# Patient Record
(demographics unavailable — no encounter records)

---

## 2025-06-18 NOTE — PHYSICAL EXAM
[No Acute Distress] : no acute distress [Normal Voice/Communication] : normal voice/communication [No Respiratory Distress] : no respiratory distress  [No Edema] : there was no peripheral edema [Normal] : affect was normal and insight and judgment were intact [de-identified] : limited PE due to telephone visit [de-identified] : no rash or skin lesions reported

## 2025-06-18 NOTE — ASSESSMENT
[FreeTextEntry1] : Ms. Peralta is a 76yo woman w/ hx of NiCMY w/ HFrEF of unclear etiology (prev dx w/ HFpEF and AFib in [place] ~2020; non-obs CAD from 11/2024 cath), AFib on apixaban, non-obstructive CAD, and asthma who is here today for post-hospitalization follow up.  #NiCMY w/ HFrEF Pt w/ reported hx of HFpEF in dx in [place], non-obstructive LHC from 11/2024 w/ a few hospitalizations for ADHF in [place] as well. She is unsure if she's ever had a CMR or further w/up of the etiology of her HF. She was taking half dose medications [place] leading to ADHF and admission at [facility] where she was found to have EF 20% w/ global hypokinesis, grade 3 DD and severe TR. She was resumed on some GDMT and diuresed and dc'ed.  Etiology: NiCMY - unclear further, will require w/up GDMT: ARB, BB, MRA as below Rhythm: Currently in AFib, rate controlled; LBBB - will likely require CRT upgrade if EF doesn't improve - start losartan 25mg qd, discussed side effects and red flag sx to be aware of - Switch metop tartrate to metop succinate 100mg qd; instructed to take at night to avoid day time fatigue - Continue Spironolactone 25mg qd - Continue Lasix 20mg qd - can consider SGLT2i at next visit if pt able to pay out of pocket - will get r/p TTE at next visit  #AFib Unkonwn hx of AFib, but seems likely long standing persistent AFib given hx. [id number] is 6 (CHF, Age x2, DM, CAD, Female). Pt would likely benefit from EP referral given HFrEF, LBBB and Afib for consideration of ablation +/- CRT therapy. - Continue Apixaban 5mg BID - Continue BB as above - DC digoxin - discuss EP referral at next visit for CRT + AFib ablation evaluation  #Non-Obstructive CAD Labs 5/2025: Tchol 124, HDL 43, LDL 81, TGs 124, A1c 5.6, Cr 0.8 - Goal LDL <70 - Continue Rosuvastatin 10mg - No indication for ASA given AC as above - r/p lipids at next visit  RTC in 2-4w for f/up with new medications and lab check

## 2025-06-18 NOTE — PHYSICAL EXAM

## 2025-06-18 NOTE — PHYSICAL EXAM
[No Acute Distress] : no acute distress [Normal Voice/Communication] : normal voice/communication [No Respiratory Distress] : no respiratory distress  [No Edema] : there was no peripheral edema [Normal] : affect was normal and insight and judgment were intact [de-identified] : limited PE due to telephone visit [de-identified] : no rash or skin lesions reported

## 2025-06-18 NOTE — PLAN
[FreeTextEntry1] : 1.continue all medications as prescribed 2. f/u with CARDS/PCP 3. Maintain DASH diet 4. maintain fall precaution

## 2025-06-18 NOTE — HISTORY OF PRESENT ILLNESS
[Home] : at home, [unfilled] , at the time of the visit. [Other Location: e.g. Home (Enter Location, City,State)___] : at [unfilled] [Telephone (audio)] : This telephonic visit was provided via audio only technology. [Patient preference] : patient preference. [Verbal consent obtained from patient] : the patient, [unfilled] [FreeTextEntry1] : F/u post hospitalization at [facility] from 5/30-6/4 for CHF exacerbation, Afib with RVR, elevated trop, chest pain [de-identified] : Brief Hospital Course copied: 77 years old female with history of HTN, HLD, Asthma, A. fib on apixaban, HFpEF, nonobstructive CAD, DM II presented w/ SOB and episode of chest pain & admitted w/ A. fib w/ RVR and acute on chronic sys/ diastolic congestive heart failure. Her atrial fibrillation with rapid ventricular response was likely CHF driven and improved w/ metoprolol and dig. She was also given Eliquis. Cardiology was consulted. She was put on metoprolol, Aldactone and Lasix for her acute on chronic sys/ diastolic congestive heart failure. ECHO showed EF  21% w/ global left ventricular hypokinesis, moderate left ventricular hypertrophy, severe (grade 3) left ventricular diastolic dysfunction, moderately dilated bilateral atria, trace aortic regurgitation, mild mitral regurgitation, severe tricuspid regurgitation, mild pulmonic regurgitation & mild pulmonary hypertension. Her dyspnea has resolved. She also had elevated troponin that peaked at 477. Cath 11/28/2024 Left main-normal. Circumflex 20-30%, RCA 20-30% (record from niece, done in [place], copy of ECHO, prior EKG strip and Cath placed in patient's chart). This was likely demand due to CHF exacerbation.   Telephone visit made with pt and her niece and nephew. Pt is awake and alert able to answer some questions and nephew provides additional information. 3. Pt lives with family. She ambulates with a walker and family members assist her when ambulating. Pt/nephew   deny CP, SOB, vision changes, uses a reading glasses, nausea, vomiting, diarrhea, constipation, falls, edema. Reports feeling better since being home Reports appetite is intact, sleeping well at nights. Skin is intact. Med rec done:  pt/nephew reports they have all medications and pt is taking them as prescribed.

## 2025-06-18 NOTE — PHYSICAL EXAM

## 2025-06-18 NOTE — HISTORY OF PRESENT ILLNESS
[Home] : at home, [unfilled] , at the time of the visit. [Other Location: e.g. Home (Enter Location, City,State)___] : at [unfilled] [Telephone (audio)] : This telephonic visit was provided via audio only technology. [Patient preference] : patient preference. [Verbal consent obtained from patient] : the patient, [unfilled] [FreeTextEntry1] : [facility]F exacerbation, Afib with RVR, elevated trop, chest pain [de-identified] : Brief Hospital Course copied: 77 years old female with history of HTN, HLD, Asthma, A. fib on apixaban, HFpEF, nonobstructive CAD, DM II presented w/ SOB and episode of chest pain & admitted w/ A. fib w/ RVR and acute on chronic sys/ diastolic congestive heart failure. Her atrial fibrillation with rapid ventricular response was likely CHF driven and improved w/ metoprolol and dig. She was also given Eliquis. Cardiology was consulted. She was put on metoprolol, Aldactone and Lasix for her acute on chronic sys/ diastolic congestive heart failure. ECHO showed EF  21% w/ global left ventricular hypokinesis, moderate left ventricular hypertrophy, severe (grade 3) left ventricular diastolic dysfunction, moderately dilated bilateral atria, trace aortic regurgitation, mild mitral regurgitation, severe tricuspid regurgitation, mild pulmonic regurgitation & mild pulmonary hypertension. Her dyspnea has resolved. She also had elevated troponin that peaked at 477. Cath 11/28/2024 Left main-normal. Circumflex 20-30%, RCA 20-30% (record from niece, done in [place], copy of ECHO, prior EKG strip and Cath placed in patient's chart). This was likely demand due to CHF exacerbation.   Telephone visit made with pt and her niece and nephew. Pt is awake and alert able to answer some questions and nephew provides additional information. 3. Pt lives with family. She ambulates with a walker and family members assist her when ambulating. Pt/nephew   deny CP, SOB, vision changes, uses a reading glasses, nausea, vomiting, diarrhea, constipation, falls, edema. Reports feeling better since being home Reports appetite is intact, sleeping well at nights. Skin is intact. Med rec done:  pt/nephew reports they have all medications and pt is taking them as prescribed.

## 2025-06-18 NOTE — PHYSICAL EXAM
[No Acute Distress] : no acute distress [Normal Voice/Communication] : normal voice/communication [No Respiratory Distress] : no respiratory distress  [No Edema] : there was no peripheral edema [Normal] : affect was normal and insight and judgment were intact [de-identified] : limited PE due to telephone visit [de-identified] : no rash or skin lesions reported

## 2025-06-18 NOTE — REVIEW OF SYSTEMS
[Fatigue] : fatigue [Vision Problems] : vision problems [Muscle Weakness] : muscle weakness [Negative] : Heme/Lymph [Fever] : no fever [Chills] : no chills [Hearing Loss] : no hearing loss [Chest Pain] : no chest pain [Lower Ext Edema] : no lower extremity edema [Shortness Of Breath] : no shortness of breath [Wheezing] : no wheezing [Cough] : no cough [Abdominal Pain] : no abdominal pain [Nausea] : no nausea [Constipation] : no constipation [Vomiting] : no vomiting [Dysuria] : no dysuria [Incontinence] : no incontinence [Hematuria] : no hematuria [Joint Pain] : no joint pain [Joint Stiffness] : no joint stiffness [Muscle Pain] : no muscle pain [Back Pain] : no back pain [Itching] : no itching [Skin Rash] : no skin rash [Headache] : no headache [Dizziness] : no dizziness [Unsteady Walking] : no ataxia [Suicidal] : not suicidal [Insomnia] : no insomnia [FreeTextEntry3] : reading

## 2025-06-18 NOTE — ASSESSMENT
[FreeTextEntry1] : Ms. Peralta is a 78yo woman w/ hx of NiCMY w/ HFrEF of unclear etiology (prev dx w/ HFpEF and AFib in [place] ~2020; non-obs CAD from 11/2024 cath), AFib on apixaban, non-obstructive CAD, and asthma who is here today for post-hospitalization follow up.  #NiCMY w/ HFrEF Pt w/ reported hx of HFpEF in dx in [place], non-obstructive LHC from 11/2024 w/ a few hospitalizations for ADHF in [place] as well. She is unsure if she's ever had a CMR or further w/up of the etiology of her HF. She was taking half dose medications [place] leading to ADHF and admission at [facility] where she was found to have EF 20% w/ global hypokinesis, grade 3 DD and severe TR. She was resumed on some GDMT and diuresed and dc'ed.  Etiology: NiCMY - unclear further, will require w/up GDMT: ARB, BB, MRA as below Rhythm: Currently in AFib, rate controlled; LBBB - will likely require CRT upgrade if EF doesn't improve - start losartan 25mg qd, discussed side effects and red flag sx to be aware of - Switch metop tartrate to metop succinate 100mg qd; instructed to take at night to avoid day time fatigue - Continue Spironolactone 25mg qd - Continue Lasix 20mg qd - can consider SGLT2i at next visit if pt able to pay out of pocket - will get r/p TTE at next visit  #AFib Unkonwn hx of AFib, but seems likely long standing persistent AFib given hx. [id number] is 6 (CHF, Age x2, DM, CAD, Female). Pt would likely benefit from EP referral given HFrEF, LBBB and Afib for consideration of ablation +/- CRT therapy. - Continue Apixaban 5mg BID - Continue BB as above - DC digoxin - discuss EP referral at next visit for CRT + AFib ablation evaluation  #Non-Obstructive CAD Labs 5/2025: Tchol 124, HDL 43, LDL 81, TGs 124, A1c 5.6, Cr 0.8 - Goal LDL <70 - Continue Rosuvastatin 10mg - No indication for ASA given AC as above - r/p lipids at next visit  RTC in 2-4w for f/up with new medications and lab check

## 2025-06-18 NOTE — HISTORY OF PRESENT ILLNESS
[FreeTextEntry1] : Ms. Peralta is a 78yo woman w/ hx of NiCMY w/ HFrEF of unclear etiology (prev dx w/ HFpEF and AFib in [place] ~2020), AFib on apixaban, non-obstructive CAD, and asthma who is here today for post-hospitalization follow up.  Pt has never been seen in cardiology clinic at [facility] before. [place], lives more permanently in [place], however is planning to now spend part of the [place] in [place] moving [place]. She was dx with HFpEF and AFib years ago (she had an admission in ~2015 for something cardiac related (was told she had a minor MI) but is unsure), and was told her HF was 55% in 2020 when she was hospitalized for ADHF. She has had a few hospitalizations for HF in [place], the last in 8/2024 (was told HF was still ~50%), and she had a angiogram in 11/2024 in [place] that showed mild, non-obstructive CAD. Her CV medications in [place] were Telmisartan, Bisoprolol, Spironolactone, lasix, Apixaban, and Rosuvastatin which suggest possible HFrEF, but unclear. She has the report with her of her Cath (entered below). [place] and had been taking half doses of medications in order to ensure she didn't run out. She developed significant CATALAN, orthopnea and PND which prompted her to present to [facility] in late 5/2025 where she was found to be in ADHF w/ AFib w/ RVR. She was started on diuresis and rate control. She was dc'ed on 6/9 on Apixaban 5mg BID, Lasix 20mg qd, Metop tartrate 25mg TID, Dig 125mcg, Spironolactone 25mg and rosuvastatin 10mg.   Since DC, she reports feeling significantly better, denies further orthopnea, PND, LE edema (had minimal edema prior to the hospitalization), chest pain, SOB, palpitations, dizziness. She is living with her niece and her nieces  who help with her medical care. She has been walking multiple times a day and can walk on level ground w/o stopping for >5min w/o issue. She has been checking her BP at home which has been ranging in the 100-140s/70-80s. She has no  complaints today. [place] the [place] few months prior to returning to [place] in the fall.  CV Hx from [place]: - Prior AFib, doesn't know duration or if any rhythm control strategy has been attempted; has been on apixaban and BB - HFrEF, NiCMY - prev told she had HFpEF as recent as 8/2024 w/ a few hospitalizations in past for ADHF in [place]; [facility] w/ mild non-obstructive CAD from 11/2024, doesn't know if she's had any further work up. (has been on ARB, BB, MRA) - CV Meds in [place]: Telmisartan, Bisoprolol, Spironolactone, lasix, Apixaban, and Rosuvastatin   CV Meds: Apixaban 5mg BID Rosuvastatin 10mg qd Lasix 20mg qd Spironolactone 25mg qd Metop tartate 25mg TID Dig 125mcg  Labs 5/2025: Tchol 124, HDL 43, LDL 81, TGs 124, A1c 5.6, Cr 0.8  TTE 5/30/25 LVED 21% w/ mild dilated LV, global hypokinesis, moderate LVH, grade 3 diastolic dysfx, GLS -4.4%, severe TR.   [facility] 11/28/2024 done in [place]  LCx 20-30%, RCA 20-30%

## 2025-06-18 NOTE — HISTORY OF PRESENT ILLNESS
[Home] : at home, [unfilled] , at the time of the visit. [Other Location: e.g. Home (Enter Location, City,State)___] : at [unfilled] [Telephone (audio)] : This telephonic visit was provided via audio only technology. [Patient preference] : patient preference. [Verbal consent obtained from patient] : the patient, [unfilled] [FreeTextEntry1] : F/u post hospitalization at [facility] from 5/30-6/4 for CHF exacerbation, Afib with RVR, elevated trop, chest pain [de-identified] : Brief Hospital Course copied: 77 years old female with history of HTN, HLD, Asthma, A. fib on apixaban, HFpEF, nonobstructive CAD, DM II presented w/ SOB and episode of chest pain & admitted w/ A. fib w/ RVR and acute on chronic sys/ diastolic congestive heart failure. Her atrial fibrillation with rapid ventricular response was likely CHF driven and improved w/ metoprolol and dig. She was also given Eliquis. Cardiology was consulted. She was put on metoprolol, Aldactone and Lasix for her acute on chronic sys/ diastolic congestive heart failure. ECHO showed EF  21% w/ global left ventricular hypokinesis, moderate left ventricular hypertrophy, severe (grade 3) left ventricular diastolic dysfunction, moderately dilated bilateral atria, trace aortic regurgitation, mild mitral regurgitation, severe tricuspid regurgitation, mild pulmonic regurgitation & mild pulmonary hypertension. Her dyspnea has resolved. She also had elevated troponin that peaked at 477. Cath 11/28/2024 Left main-normal. Circumflex 20-30%, RCA 20-30% (record from niece, done in [place], copy of ECHO, prior EKG strip and Cath placed in patient's chart). This was likely demand due to CHF exacerbation.   Telephone visit made with pt and her niece and nephew. Pt is awake and alert able to answer some questions and nephew provides additional information. 3. Pt lives with family. She ambulates with a walker and family members assist her when ambulating. Pt/nephew   deny CP, SOB, vision changes, uses a reading glasses, nausea, vomiting, diarrhea, constipation, falls, edema. Reports feeling better since being home Reports appetite is intact, sleeping well at nights. Skin is intact. Med rec done:  pt/nephew reports they have all medications and pt is taking them as prescribed.

## 2025-06-18 NOTE — ASSESSMENT
[FreeTextEntry1] : 77 years old female with history of HTN, HLD, Asthma, A. fib on apixaban, HFpEF, Asthma, nonobstructive CAD, DM II presented w/ SOB and episode of chest pain & admitted w/ A. fib w/ RVR and acute on chronic sys/ diastolic congestive heart failure. Her atrial fibrillation with rapid ventricular response was likely CHF driven and improved w/ metoprolol and dig. She was also given Eliquis. Cardiology was consulted. She was put on metoprolol, Aldactone and Lasix for her acute on chronic sys/ diastolic congestive heart failure. ECHO showed EF  21% w/ global left ventricular hypokinesis, moderate left ventricular hypertrophy, severe (grade 3) left

## 2025-06-24 NOTE — HISTORY OF PRESENT ILLNESS
[FreeTextEntry1] : F/u post hospitalization at [facility] form 5/30-4/4 for CHF exacerbation, Afib with RVR, elevated trop, chest pain [Home] : at home, [unfilled] , at the time of the visit. [Other Location: e.g. Home (Enter Location, City,State)___] : at [unfilled] [Telehealth (audio & video)] : This visit was provided via telehealth using real-time 2-way audio visual technology. [Verbal consent obtained from patient] : the patient, [unfilled] [de-identified] : Brief Hospital Course copied: 77 years old female with history of HTN, HLD, Asthma, A. fib on apixaban, HFpEF, Asthma, nonobstructive CAD, DM II presented w/ SOB and episode of chest pain & admitted w/ A. fib w/ RVR and acute on chronic sys/ diastolic congestive  heart failure. Her atrial fibrillation with rapid ventricular response was likely CHF driven and improved w/ metoprolol and dig. She was also given Eliquis. Cardiology was consulted. She was put on metoprolol, Aldactone and Lasix for her acute on chronic sys/ diastolic congestive heart failure. ECHO showed EF  21% w/ global left ventricular hypokinesis, moderate left ventricular hypertrophy, severe (grade 3) left ventricular diastolic dysfunction, moderately dilated bilateral atria, trace aortic regurgitation, mild mitral regurgitation, severe tricuspid regurgitation, mild pulmonic regurgitation & mild pulmonary hypertension. Her dyspnea has resolved. She also had elevated troponin that peaked at 477. Cath 11/28/2024 Left main- normal. Circumflex 20-30%, RCA 20-30% (record from niece, done in [place], copy of ECHO, prior EKG strip and Cath placed in patient's chart). This was likely demand due to CHF exacerbation.  -Telephone visit made with pt, niece and nephew. Pt is visiting from [place]. Family provided translation pt denies cp, sob, palp, appetite is improving, Sleep, bowel and bladder wnl. Pt to f/u with PCP/CARDs to be cleared prior to flying home.  Pt with dx of DM, no medications prescribed, continue diabetic diet consistent carb. -pt receiving homecare RN, SW, PT - -

## 2025-06-24 NOTE — ASSESSMENT
[FreeTextEntry1] : 77 years old female with history of HTN, HLD, Asthma, A. fib on apixaban, HFpEF, Asthma, nonobstructive CAD, DM II presented w/ SOB and episode of chest pain & admitted w/ A. fib w/ RVR and acute on chronic sys/ diastolic congestive heart failure. Her atrial fibrillation with rapid ventricular response was likely CHF driven and improved w/ metoprolol and dig. She was also given Eliquis. Cardiology was consulted. She was put on Metoprolol, Aldactone and lasix for her acute on chronic sys/ diastolic congestive heart failure. ECHO showed EF  21% w/ global left ventricular hypokinesis, moderate left ventricular hypertrophy, severe (grade 3) left ventricular diastolic dysfunction, moderately dilated bilateral atria, trace aortic regurgitation, mild mitral regurgitation, severe tricuspid regurgitation, mild pulmonic regurgitation & mild pulmonary hypertension. Her dyspnea has resolved. She also had elevated troponin that peaked at 477. Cath 11/28/2024 Left main-normal. Circumflex 20-30%, RCA 20-30% (record from niece, done in [place], copy of ECHO, prior EKG strip and Cath placed in patient's chart). This was likely demand due to CHF exacerbation.

## 2025-06-24 NOTE — PHYSICAL EXAM
[No Acute Distress] : no acute distress [Normal Voice/Communication] : normal voice/communication [No Respiratory Distress] : no respiratory distress  [Normal] : affect was normal and insight and judgment were intact [de-identified] : Limited PE

## 2025-06-24 NOTE — PLAN
[FreeTextEntry1] : 1. cont blood glucose monitoring  2. continue all medications as prescribed 3. f/u with CARDS/PCP 4. Maintain diabetic, DASH diet 5 maintain fall precaution

## 2025-06-24 NOTE — REVIEW OF SYSTEMS
[Fever] : no fever [Chills] : no chills [Fatigue] : no fatigue [Vision Problems] : no vision problems [Hearing Loss] : no hearing loss [Chest Pain] : no chest pain [Lower Ext Edema] : no lower extremity edema [Shortness Of Breath] : no shortness of breath [Cough] : no cough [Abdominal Pain] : no abdominal pain [Nausea] : no nausea [Constipation] : no constipation [Vomiting] : no vomiting [Incontinence] : no incontinence [Hematuria] : no hematuria [Joint Pain] : no joint pain [Muscle Weakness] : no muscle weakness [Muscle Pain] : no muscle pain [Skin Rash] : no skin rash [Dizziness] : no dizziness [Insomnia] : no insomnia [Negative] : Heme/Lymph

## 2025-06-24 NOTE — PHYSICAL EXAM
[No Acute Distress] : no acute distress [Normal Voice/Communication] : normal voice/communication [No Respiratory Distress] : no respiratory distress  [Normal] : affect was normal and insight and judgment were intact [de-identified] : Limited PE

## 2025-06-24 NOTE — HISTORY OF PRESENT ILLNESS
[FreeTextEntry1] : F/u post hospitalization at [facility] form 5/30-4/4 for CHF exacerbation, Afib with RVR, elevated trop, chest pain [Home] : at home, [unfilled] , at the time of the visit. [Other Location: e.g. Home (Enter Location, City,State)___] : at [unfilled] [Telehealth (audio & video)] : This visit was provided via telehealth using real-time 2-way audio visual technology. [Verbal consent obtained from patient] : the patient, [unfilled] [de-identified] : Brief Hospital Course copied: 77 years old female with history of HTN, HLD, Asthma, A. fib on apixaban, HFpEF, Asthma, nonobstructive CAD, DM II presented w/ SOB and episode of chest pain & admitted w/ A. fib w/ RVR and acute on chronic sys/ diastolic congestive  heart failure. Her atrial fibrillation with rapid ventricular response was likely CHF driven and improved w/ metoprolol and dig. She was also given Eliquis. Cardiology was consulted. She was put on metoprolol, Aldactone and Lasix for her acute on chronic sys/ diastolic congestive heart failure. ECHO showed EF  21% w/ global left ventricular hypokinesis, moderate left ventricular hypertrophy, severe (grade 3) left ventricular diastolic dysfunction, moderately dilated bilateral atria, trace aortic regurgitation, mild mitral regurgitation, severe tricuspid regurgitation, mild pulmonic regurgitation & mild pulmonary hypertension. Her dyspnea has resolved. She also had elevated troponin that peaked at 477. Cath 11/28/2024 Left main- normal. Circumflex 20-30%, RCA 20-30% (record from niece, done in [place], copy of ECHO, prior EKG strip and Cath placed in patient's chart). This was likely demand due to CHF exacerbation.  -Telephone visit made with pt, niece and nephew. Pt is visiting from [place]. Family provided translation pt denies cp, sob, palp, appetite is improving, Sleep, bowel and bladder wnl. Pt to f/u with PCP/CARDs to be cleared prior to flying home.  Pt with dx of DM, no medications prescribed, continue diabetic diet consistent carb. -pt receiving homecare RN, SW, PT - -

## 2025-06-26 NOTE — HISTORY OF PRESENT ILLNESS
[de-identified] : Moo Peralta is a 77 years old female with history of HTN, HLD, Asthma, A. fib on apixaban, HFpEF, Asthma, nonobstructive CAD, DM II presented for NPA. She recently came from [place] on a travel visa. Notably she recently was hospitalized for HF exacerbation c/b Afib with RVR. Since hospitalization she says she feels better and her main concerns today are: 1. Wants rollator as mobility is difficult 2. Wants pulm referral for asthma 3. Medications are too expensive

## 2025-06-26 NOTE — PHYSICAL EXAM
[Normal] : affect was normal and insight and judgment were intact [de-identified] : Irregular rate

## 2025-06-26 NOTE — ASSESSMENT
[FreeTextEntry1] : Moo Peralta is a 77 years old female with history of HTN, HLD, Asthma, A. fib on apixaban, HFpEF, Asthma, nonobstructive CAD, DM II presented for NPA. She is concerned today about getting a rolling walker, getting meds to be affordable, and pulm referral for her asthma. Health risk assessment positive for social needs, other than this no concerns.   #HFrEF #Afib with RVR - Follows with cardiology, has next appointment July 8, 2025 - C/w apixaban 5mg BID, spironolactone 25mg QD, Lasix 20mg QD, metoprolol succinate 100mg QD, and losartan 25mg QD  #HLD - C/w rosuvastatin 10mg QD  #Asthma - C/w fluticasone-salmeterol 230mcg-21mcg/inhaler aerosol - Pulm referral made  #Mobility issues - Rolling walker prescribed  #HCM - SDOH positive for requesting public assistance, affording medications. Resources given. - SALONI form filled - Basic labs ordered  - Mammo and fecal occult testing ordered  Discussed with Dr Robbins  RTC in 5 weeks to address vaccine needs (Pt asked to bring in list of vaccines)  Fernando Altman MD PGY-2 Internal Medicine    [Vaccines Reviewed] : Immunizations reviewed today. Please see immunization details in the vaccine log within the immunization flowsheet.

## 2025-06-26 NOTE — PHYSICAL EXAM
[Normal] : affect was normal and insight and judgment were intact [de-identified] : Irregular rate

## 2025-06-26 NOTE — HISTORY OF PRESENT ILLNESS
[de-identified] : Moo Peralta is a 77 years old female with history of HTN, HLD, Asthma, A. fib on apixaban, HFpEF, Asthma, nonobstructive CAD, DM II presented for NPA. She recently came from [place] on a travel visa. Notably she recently was hospitalized for HF exacerbation c/b Afib with RVR. Since hospitalization she says she feels better and her main concerns today are: 1. Wants rollator as mobility is difficult 2. Wants pulm referral for asthma 3. Medications are too expensive

## 2025-06-26 NOTE — HEALTH RISK ASSESSMENT
[Good] : ~his/her~  mood as  good [1 or 2 (0 pts)] : 1 or 2 (0 points) [Never (0 pts)] : Never (0 points) [No falls in past year] : Patient reported no falls in the past year [0] : 2) Feeling down, depressed, or hopeless: Not at all (0) [Patient reported mammogram was normal] : Patient reported mammogram was normal [With Family] : lives with family [Unemployed] : unemployed [High School] : high school [] :  [Feels Safe at Home] : Feels safe at home [Smoke Detector] : smoke detector [Seat Belt] :  uses seat belt [Never] : Never [NO] : No [FreeTextEntry1] : Wants referral for pulmonology because of asthma  [de-identified] : Cardiologist [de-identified] : Does physical activity in the home such as cleaning [[id number]] : 0 [Change in mental status noted] : No change in mental status noted [Language] : denies difficulty with language [Behavior] : denies difficulty with behavior [Learning/Retaining New Information] : denies difficulty learning/retaining new information [Handling Complex Tasks] : denies difficulty handling complex tasks [Reasoning] : denies difficulty with reasoning [Spatial Ability and Orientation] : denies difficulty with spatial ability and orientation [Financial] : financial [Access to Medications] : access to medications [ SDOH Screening] : Social service referral provided to patient and briefly addressed in the office. [Time Spent: ___ Minutes] : I spent [unfilled] minutes performing an SDOH assessment. [Sexually Active] : not sexually active [Reports changes in hearing] : Reports no changes in hearing [Reports changes in vision] : Reports no changes in vision [Reports changes in dental health] : Reports no changes in dental health [MammogramComments] : 2023 BIRADS 1 [PapSmearComments] : Hysterectomy in 1998 [ColonoscopyComments] : Never [de-identified] : Takes care of herself but having walking troubles

## 2025-07-08 NOTE — ASSESSMENT
[FreeTextEntry1] : Ms. Peralta is a 76yo woman w/ hx of NiCMY w/ HFrEF of unclear etiology (prev dx w/ HFpEF and AFib in [place] ~2020; non-obs CAD from 11/2024 cath), AFib on apixaban, non-obstructive CAD, and asthma who is here today for post-hospitalization follow up.  #NiCMY w/ HFrEF Pt w/ reported hx of HFpEF in dx in [place], non-obstructive LHC from 11/2024 w/ a few hospitalizations for ADHF in [place] as well. She is unsure if she's ever had a CMR or further w/up of the etiology of her HF. She was taking half dose medications [place] leading to ADHF and admission at [facility] where she was found to have EF 20% w/ global hypokinesis, grade 3 DD and severe TR. She was resumed on some GDMT and diuresed and dc'ed. After initiating care w/ me she trialed low dose ARB but was c/b hypotension so it has been held.  Etiology: NiCMY - unclear specific etiology, possible tachy induced vs dysynchrony from LBBB vs infiltrative disease GDMT: on BB and MRA, unable to tolerate ARB/ARNi due to hyoptension, unable to get SGLT2i due to cost Rhythm: Currently in AFib, rate controlled; LBBB - will likely require CRT upgrade if EF doesn't improve - Unable to tolerate ARB/ANRI iso hyoptension - Continue Metop succ 50mg qd - Continue Spironolactone 12.5mg qd - Increase Lasix to 40mg qd - TTE ordered today to assess change in LV fx now that on max tolerated GDMT - CMR ordered to assess for scar and potential patterns suggestive of underlying etiology - Labs today: TSH, HIV, Serum immunofixation / free light chains, BMP, A1c and lipids - EP referral both for CRT-D evaluation and for consideration of possible AFib ablation  #AFib Unkonwn hx of AFib, but seems likely long standing persistent AFib given hx. [id number] is 6 (CHF, Age x2, DM, CAD, Female). Pt would likely benefit from EP referral given HFrEF, LBBB and Afib for consideration of ablation +/- CRT therapy. - Continue Apixaban 5mg BID, discussed calling company to get patient assistance program (from [employer]) - if unable would consider warfarin - Continue BB as above - EP referral for consideration of CRT-D and AFib ablation as above  #Non-Obstructive CAD Labs 5/2025: Tchol 124, HDL 43, LDL 81, TGs 124, A1c 5.6, Cr 0.8 - Goal LDL <70 - Continue Rosuvastatin 10mg - No indication for ASA given AC as above - r/p lipids today  RTC in 1m

## 2025-07-08 NOTE — HISTORY OF PRESENT ILLNESS
[FreeTextEntry1] : Ms. Peralta is a 76yo woman w/ hx of NiCMY w/ HFrEF of unclear etiology (prev dx w/ HFpEF and AFib in [place] ~2020), AFib on apixaban, non-obstructive CAD, and asthma who is here today for post-hospitalization follow up.  She last saw me on 6/17/2025 to establish cardiac care. In the interim she trialed starting losartan 25 but course was c/b hypotension w/ BPs in the 70/40s so it was dc'ed. She also reported some mild weight gain. Since her BP has ranged in the high 90-100s, weights have been stable in the 146-147 range w/ mild LE edema, denies chest pain, SOB, CATALAN, palpitaitons, dizziness, orthopnea, PND. She has been walking 30+min a day w/o sx. She reports running low on Apixaban and the cost being very expensive, otherwise has other medications.  CV Meds: Apixaban 5mg BID Rosuvastatin 10mg qd Metop Succinate 50mg Spironolactone 12.5mg qd Lasix 20mg qd  Labs 5/2025: Tchol 124, HDL 43, LDL 81, TGs 124, A1c 5.6, Cr 0.8  TTE 5/30/25 LVED 21% w/ mild dilated LV, global hypokinesis, moderate LVH, grade 3 diastolic dysfx, GLS -4.4%, severe TR.  [facility] 11/28/2024 done in [place] LCx 20-30%, RCA 20-30%

## 2025-07-08 NOTE — ASSESSMENT
[FreeTextEntry1] : Ms. Peralta is a 78yo woman w/ hx of NiCMY w/ HFrEF of unclear etiology (prev dx w/ HFpEF and AFib in [place] ~2020; non-obs CAD from 11/2024 cath), AFib on apixaban, non-obstructive CAD, and asthma who is here today for post-hospitalization follow up.  #NiCMY w/ HFrEF Pt w/ reported hx of HFpEF in dx in [place], non-obstructive LHC from 11/2024 w/ a few hospitalizations for ADHF in [place] as well. She is unsure if she's ever had a CMR or further w/up of the etiology of her HF. She was taking half dose medications [place] leading to ADHF and admission at [facility] where she was found to have EF 20% w/ global hypokinesis, grade 3 DD and severe TR. She was resumed on some GDMT and diuresed and dc'ed. After initiating care w/ me she trialed low dose ARB but was c/b hypotension so it has been held.  Etiology: NiCMY - unclear specific etiology, possible tachy induced vs dysynchrony from LBBB vs infiltrative disease GDMT: on BB and MRA, unable to tolerate ARB/ARNi due to hyoptension, unable to get SGLT2i due to cost Rhythm: Currently in AFib, rate controlled; LBBB - will likely require CRT upgrade if EF doesn't improve - Unable to tolerate ARB/ANRI iso hyoptension - Continue Metop succ 50mg qd - Continue Spironolactone 12.5mg qd - Increase Lasix to 40mg qd - TTE ordered today to assess change in LV fx now that on max tolerated GDMT - CMR ordered to assess for scar and potential patterns suggestive of underlying etiology - Labs today: TSH, HIV, Serum immunofixation / free light chains, BMP, A1c and lipids - EP referral both for CRT-D evaluation and for consideration of possible AFib ablation  #AFib Unkonwn hx of AFib, but seems likely long standing persistent AFib given hx. [id number] is 6 (CHF, Age x2, DM, CAD, Female). Pt would likely benefit from EP referral given HFrEF, LBBB and Afib for consideration of ablation +/- CRT therapy. - Continue Apixaban 5mg BID, discussed calling company to get patient assistance program (from [employer]) - if unable would consider warfarin - Continue BB as above - EP referral for consideration of CRT-D and AFib ablation as above  #Non-Obstructive CAD Labs 5/2025: Tchol 124, HDL 43, LDL 81, TGs 124, A1c 5.6, Cr 0.8 - Goal LDL <70 - Continue Rosuvastatin 10mg - No indication for ASA given AC as above - r/p lipids today  RTC in 1m

## 2025-07-08 NOTE — HISTORY OF PRESENT ILLNESS
[FreeTextEntry1] : Ms. Peralta is a 76yo woman w/ hx of NiCMY w/ HFrEF of unclear etiology (prev dx w/ HFpEF and AFib in [place] ~2020), AFib on apixaban, non-obstructive CAD, and asthma who is here today for post-hospitalization follow up.  She last saw me on 6/17/2025 to establish cardiac care. In the interim she trialed starting losartan 25 but course was c/b hypotension w/ BPs in the 70/40s so it was dc'ed. She also reported some mild weight gain. Since her BP has ranged in the high 90-100s, weights have been stable in the 146-147 range w/ mild LE edema, denies chest pain, SOB, CATALAN, palpitaitons, dizziness, orthopnea, PND. She has been walking 30+min a day w/o sx. She reports running low on Apixaban and the cost being very expensive, otherwise has other medications.  CV Meds: Apixaban 5mg BID Rosuvastatin 10mg qd Metop Succinate 50mg Spironolactone 12.5mg qd Lasix 20mg qd  Labs 5/2025: Tchol 124, HDL 43, LDL 81, TGs 124, A1c 5.6, Cr 0.8  TTE 5/30/25 LVED 21% w/ mild dilated LV, global hypokinesis, moderate LVH, grade 3 diastolic dysfx, GLS -4.4%, severe TR.  [facility] 11/28/2024 done in [place] LCx 20-30%, RCA 20-30%

## 2025-07-08 NOTE — ASSESSMENT
[FreeTextEntry1] : Ms. Peralta is a 76yo woman w/ hx of NiCMY w/ HFrEF of unclear etiology (prev dx w/ HFpEF and AFib in [place] ~2020; non-obs CAD from 11/2024 cath), AFib on apixaban, non-obstructive CAD, and asthma who is here today for post-hospitalization follow up.  #NiCMY w/ HFrEF Pt w/ reported hx of HFpEF in dx in [place], non-obstructive LHC from 11/2024 w/ a few hospitalizations for ADHF in [place] as well. She is unsure if she's ever had a CMR or further w/up of the etiology of her HF. She was taking half dose medications [place] leading to ADHF and admission at [facility] where she was found to have EF 20% w/ global hypokinesis, grade 3 DD and severe TR. She was resumed on some GDMT and diuresed and dc'ed. After initiating care w/ me she trialed low dose ARB but was c/b hypotension so it has been held.  Etiology: NiCMY - unclear specific etiology, possible tachy induced vs dysynchrony from LBBB vs infiltrative disease GDMT: on BB and MRA, unable to tolerate ARB/ARNi due to hyoptension, unable to get SGLT2i due to cost Rhythm: Currently in AFib, rate controlled; LBBB - will likely require CRT upgrade if EF doesn't improve - Unable to tolerate ARB/ANRI iso hyoptension - Continue Metop succ 50mg qd - Continue Spironolactone 12.5mg qd - Increase Lasix to 40mg qd - TTE ordered today to assess change in LV fx now that on max tolerated GDMT - CMR ordered to assess for scar and potential patterns suggestive of underlying etiology - Labs today: TSH, HIV, Serum immunofixation / free light chains, BMP, A1c and lipids - EP referral both for CRT-D evaluation and for consideration of possible AFib ablation  #AFib Unkonwn hx of AFib, but seems likely long standing persistent AFib given hx. [id number] is 6 (CHF, Age x2, DM, CAD, Female). Pt would likely benefit from EP referral given HFrEF, LBBB and Afib for consideration of ablation +/- CRT therapy. - Continue Apixaban 5mg BID, discussed calling company to get patient assistance program (from [employer]) - if unable would consider warfarin - Continue BB as above - EP referral for consideration of CRT-D and AFib ablation as above  #Non-Obstructive CAD Labs 5/2025: Tchol 124, HDL 43, LDL 81, TGs 124, A1c 5.6, Cr 0.8 - Goal LDL <70 - Continue Rosuvastatin 10mg - No indication for ASA given AC as above - r/p lipids today  RTC in 1m

## 2025-07-08 NOTE — HISTORY OF PRESENT ILLNESS
[FreeTextEntry1] : Ms. Peralta is a 76yo woman w/ hx of NiCMY w/ HFrEF of unclear etiology (prev dx w/ HFpEF and AFib in [place] ~2020), AFib on apixaban, non-obstructive CAD, and asthma who is here today for post-hospitalization follow up.  She last saw me on 6/17/2025 to establish cardiac care. In the interim she trialed starting losartan 25 but course was c/b hypotension w/ BPs in the 70/40s so it was dc'ed. She also reported some mild weight gain. Since her BP has ranged in the high 90-100s, weights have been stable in the 146-147 range w/ mild LE edema, denies chest pain, SOB, CATALAN, palpitaitons, dizziness, orthopnea, PND. She has been walking 30+min a day w/o sx. She reports running low on Apixaban and the cost being very expensive, otherwise has other medications.  CV Meds: Apixaban 5mg BID Rosuvastatin 10mg qd Metop Succinate 50mg Spironolactone 12.5mg qd Lasix 20mg qd  Labs 5/2025: Tchol 124, HDL 43, LDL 81, TGs 124, A1c 5.6, Cr 0.8  TTE 5/30/25 LVED 21% w/ mild dilated LV, global hypokinesis, moderate LVH, grade 3 diastolic dysfx, GLS -4.4%, severe TR.  [facility] 11/28/2024 done in [place] LCx 20-30%, RCA 20-30%

## 2025-07-18 NOTE — HISTORY OF PRESENT ILLNESS
[FreeTextEntry8] : Ms. JOSEPH JEFFERSON is a 77 year old Other race  female  with history of  presented today for eye infection.  Reviewed cardiology note 7/14/25: Called pt regarding r/p labs showing K now 5.7. Instructed pt to stop taking her spironolactone 12.5mg and plan to r/p BMP in 1 week (on 7/21 during EP appointment). Pt instructed to continue lasix 40mg and gave instructions to watch for any signs of volume overload. All questions answered.

## 2025-07-22 NOTE — HISTORY OF PRESENT ILLNESS
[FreeTextEntry8] :  77 years old female with history of HTN, HLD, Asthma, Afib on apixaban, HFpEF, Asthma, nonobstructive CAD, DM II came for eye infection.  Patient is in [facility] visiting family and plans to return to [facility] 11/2025.    She reports 1 week of bilateral eye itchiness, watery discharge, and redness, with yellow discharge at the medial canthus in the mornings. Denies fever, chills, or URI symptoms. Her niece reports she has hx of dry eye and uses lubricant eye ointment nightly in [place], with intermittent eye infections in the past. She is currently using artificial tears.  She had shingles last month and is interested to get Shingrix vaccine today.  She also recently followed up with cardiology. BMP showed hyperkalemia (K 5.7), and spironolactone was held. She is due to have repeat labs on 7/21 at the EP office. She denies chest pain, SOB, CATALAN, muscle weakness, palpitations, abdominal pain, or GI symptoms.

## 2025-07-22 NOTE — PLAN
[FreeTextEntry1] : 77 years old female with history of HTN, HLD, Asthma, Afib on apixaban, HFpEF, Asthma, nonobstructive CAD, DM II came for eye infection.   Dry eye vs allergic conjunctivitis.  - No evidence of bacterial conjunctivitis (normal conjunctiva, no erythema). Likely allergic conjunctivitis vs. dry eye irritation. - History of recurrent bacterial conjunctivitis per niece and she wants antibiotic.  - Start OTC eye lubricant ointment at bedtime and ketotifen BID for 1 week for allergic conjunctivitis. - If no symptom relief after 1 week, then use poly B trim drops for bacterial conjunctivitis. - Warm compresses and avoid rubbing eyes. Return if symptoms worsen or vision changes occur.  Shingles and Shingrix vaccine: - Patient had shingles last month. - Advised to wait at least 6 months after shingles episode before receiving the Shingrix vaccine.  HF/HTNAfib - Normotensive today.  - Established care with Cardio.  - c/w Abixaban, metoprolol 50mg and lasic 20mg.  Spironolactone held d/t hyperkalemia.   Hyperkalemia - No hyperkalemia symptoms.  - Spironolactone held. Repeat BMP on 7/21 at EP office.  DM - A1C 5.7 (07/2025)   HLD - LDL 52(07/2025) - c/w rosuvastatin   Asthma - well controlled. Denied SOB, wheezing, cough - c/w fluticasone- salmeterol and albuterol prn.   Return to f/u as scheduled.

## 2025-07-22 NOTE — REVIEW OF SYSTEMS
[Fever] : no fever [Chills] : no chills [Fatigue] : no fatigue [Earache] : no earache [Nasal Discharge] : no nasal discharge [Sore Throat] : no sore throat [Chest Pain] : no chest pain [Palpitations] : no palpitations [Shortness Of Breath] : no shortness of breath [Wheezing] : no wheezing [Cough] : no cough [FreeTextEntry3] : see HPI, bilateral eye itchiness, watery discharge, and redness

## 2025-07-22 NOTE — HISTORY OF PRESENT ILLNESS
[FreeTextEntry8] :  77 years old female with history of HTN, HLD, Asthma, Afib on apixaban, HFpEF, Asthma, nonobstructive CAD, DM II came for eye infection.  Patient is in [facility] visiting family and plans to return to [facility] 11/2025.    She reports 1 week of bilateral eye itchiness, watery discharge, and redness, with yellow discharge at the medial canthus in the mornings. Denies fever, chills, or URI symptoms. Her niece reports she has hx of dry eye and uses lubricant eye ointment nightly in [place], with intermittent eye infections in the past. She is currently using artificial tears.  She had shingles last month and is interested to get Shingrix vaccine today.  She also recently followed up with cardiology. BMP showed hyperkalemia (K 5.7), and spironolactone was held. She is due to have repeat labs on 7/21 at the EP office. She denies chest pain, SOB, CATALAN, muscle weakness, palpitations, abdominal pain, or GI symptoms.

## 2025-07-22 NOTE — PHYSICAL EXAM
[No Acute Distress] : no acute distress [Well-Appearing] : well-appearing [Normal Voice/Communication] : normal voice/communication [Normal Sclera/Conjunctiva] : normal sclera/conjunctiva [PERRL] : pupils equal round and reactive to light [EOMI] : extraocular movements intact [No Respiratory Distress] : no respiratory distress  [No Accessory Muscle Use] : no accessory muscle use [Clear to Auscultation] : lungs were clear to auscultation bilaterally [Normal Rate] : normal rate  [Regular Rhythm] : with a regular rhythm [Normal S1, S2] : normal S1 and S2 [No Edema] : there was no peripheral edema [de-identified] :  no discharge.

## 2025-07-22 NOTE — HISTORY OF PRESENT ILLNESS
[FreeTextEntry8] :  77 years old female with history of HTN, HLD, Asthma, Afib on apixaban, HFpEF, Asthma, nonobstructive CAD, DM II came for eye infection.  Patient is in [facility] visiting family and plans to return to [facility] 11/2025.    She reports 1 week of bilateral eye itchiness, watery discharge, and redness, with yellow discharge at the medial canthus in the mornings. Denies fever, chills, or URI symptoms. Her niece reports she has hx of dry eye and uses lubricant eye ointment nightly in [place], with intermittent eye infections in the past. She is currently using artificial tears.  She had shingles last month and is interested to get Shingrix vaccine today.  She also recently followed up with cardiology. BMP showed hyperkalemia (K 5.7), and spironolactone was held. She is due to have repeat labs on 7/21 at the EP office. She denies chest pain, SOB, CATALAN, muscle weakness, palpitations, abdominal pain, or GI symptoms.

## 2025-07-22 NOTE — PHYSICAL EXAM
[No Acute Distress] : no acute distress [Well-Appearing] : well-appearing [Normal Voice/Communication] : normal voice/communication [Normal Sclera/Conjunctiva] : normal sclera/conjunctiva [PERRL] : pupils equal round and reactive to light [EOMI] : extraocular movements intact [No Respiratory Distress] : no respiratory distress  [No Accessory Muscle Use] : no accessory muscle use [Clear to Auscultation] : lungs were clear to auscultation bilaterally [Normal Rate] : normal rate  [Regular Rhythm] : with a regular rhythm [Normal S1, S2] : normal S1 and S2 [No Edema] : there was no peripheral edema [de-identified] :  no discharge.

## 2025-07-22 NOTE — PHYSICAL EXAM
[No Acute Distress] : no acute distress [Well-Appearing] : well-appearing [Normal Voice/Communication] : normal voice/communication [Normal Sclera/Conjunctiva] : normal sclera/conjunctiva [PERRL] : pupils equal round and reactive to light [EOMI] : extraocular movements intact [No Respiratory Distress] : no respiratory distress  [No Accessory Muscle Use] : no accessory muscle use [Clear to Auscultation] : lungs were clear to auscultation bilaterally [Normal Rate] : normal rate  [Regular Rhythm] : with a regular rhythm [Normal S1, S2] : normal S1 and S2 [No Edema] : there was no peripheral edema [de-identified] :  no discharge.

## 2025-07-23 NOTE — HISTORY OF PRESENT ILLNESS
[TextBox_4] : 77F w/ HTN, HLD, Afib on apixaban, HFrEF, non-obstructive CAD, T2DM. Presents w/SOB, Recent admission for acute exacerbation of heart failure w/ elevated BNP and pulmonary edema w/ ccb AFib RVR. TTE showed EF 21% w/ grade 3 diastolic dysfunction. Felt better with IV diuresis. discharged on albuterol prn, Fluticasone salmeterol 230-21, Lasix 40qd, metoprolol er 50, Eliquis, rosuvastatin.  Reports seasonal asthma symptoms. Mostly in winter. Takes montelukast in winter.  No asthma symptoms currently.  Daughter was present and helped with translation

## 2025-07-23 NOTE — PHYSICAL EXAM
[No Acute Distress] : no acute distress [Normal Oropharynx] : normal oropharynx [Normal Appearance] : normal appearance [No Neck Mass] : no neck mass [Normal S1, S2] : normal s1, s2 [No Resp Distress] : no resp distress [Clear to Auscultation Bilaterally] : clear to auscultation bilaterally [No Abnormalities] : no abnormalities [Normal Gait] : normal gait [No Clubbing] : no clubbing [No Cyanosis] : no cyanosis [No Edema] : no edema [FROM] : FROM [Normal Color/ Pigmentation] : normal color/ pigmentation [No Focal Deficits] : no focal deficits [Oriented x3] : oriented x3 [Normal Affect] : normal affect [TextBox_54] : irregular rhythm

## 2025-07-23 NOTE — ASSESSMENT
[FreeTextEntry1] : 77F w/ HTN, HLD, Afib on apixaban, HFrEF, non-obstructive CAD, T2DM. Presents w/SOB, Recent admission for acute exacerbation of heart failure w/ elevated BNP and pulmonary edema w/ ccb AFib RVR. TTE showed EF 21% w/ grade 3 diastolic dysfunction. Felt better with IV diuresis. discharged on albuterol prn, Fluticasone salmeterol 230-21, Lasix 40qd, metoprolol er 50, Eliquis, rosuvastatin.  #Asthma Well controlled on fluticasone salmeterol Albuterol prn (has not needed) RTC PRN if symptomatic and can obtain PFTs at that time

## 2025-07-29 NOTE — HISTORY OF PRESENT ILLNESS
[FreeTextEntry1] : Lower extremity swelling, weight gain [de-identified] : Moo Peralta is a 77 years old female with history of HTN, HLD, Asthma, A. fib on apixaban, HFpEF, Asthma, nonobstructive CAD, DM II presented for follow-up visit. Her main concern today are: 1. weight gain and lower extremity swelling during the last week. Her weight on July 21st was 150 and on 29th 156. She also has noticed leg swelling in the lower extremities. She denies any SOB and exertional dyspnea. Of note, she was stopped off her spironolactone due to hyperkalemia and Lasix was increased from 20mg QD to 40mg.  2. Lesion on the buttock that per her was similar to her previous infetion with herpes zoster

## 2025-07-29 NOTE — PHYSICAL EXAM
[No Acute Distress] : no acute distress [Normal] : soft, non-tender, non-distended, no masses palpated, no HSM and normal bowel sounds [de-identified] : +Crackles on R sided lower lung field [de-identified] : +Lower extremity swelling 1+ pitting B/L

## 2025-07-29 NOTE — CARDIOLOGY SUMMARY
[de-identified] : 7/21/2025): Atrial Fibrillation @ 101 bpm; LBBB [de-identified] : TTE (5/30/2025): 1. Left ventricular cavity is mildly dilated. Abnormal septal motion consistent with left bundle branch block. Left ventricular systolic function is severely decreased with an ejection fraction of 21 % by Nunez's method of disks. Global left ventricular hypokinesis.  2. Moderate left ventricular hypertrophy.  3. There is severe (grade 3) left ventricular diastolic dysfunction, with elevated left ventricular filling pressure.  4. Left ventricular global longitudinal strain is -4.4 % is abnormal (> -16%). Images were acquired on a [employer] ultrasound system and processed on the ultrasound machine with a heart rate of 92 bpm and a blood pressure of 135/81 mmHg.  5. Left atrium is moderately dilated.  6. The right atrium is moderately dilated.  7. Trace aortic regurgitation.  8. Mild mitral regurgitation. The mitral regurgitant jet is centrally directed. Mechanism of mitral regurgitation: The mechanism of mitral regurgitation is secondary due to left venrticular dysfunction.  9. Severe tricuspid regurgitation. 10. Mild pulmonic regurgitation. 11. Estimated pulmonary artery systolic pressure is 39 mmHg, consistent with mild pulmonary hypertension.

## 2025-07-29 NOTE — REVIEW OF SYSTEMS
[Negative] : Heme/Lymph [Palpitations] : palpitations [Blurry Vision] : no blurred vision [Sore Throat] : no sore throat [Syncope] : no syncope [Cough] : no cough [Wheezing] : no wheezing [Abdominal Pain] : no abdominal pain [Nausea] : no nausea [Vomiting] : no vomiting [Diarrhea] : diarrhea [Rash] : no rash [FreeTextEntry5] : See HPI

## 2025-07-29 NOTE — HISTORY OF PRESENT ILLNESS
[FreeTextEntry1] : Lower extremity swelling, weight gain [de-identified] : Moo Peralta is a 77 years old female with history of HTN, HLD, Asthma, A. fib on apixaban, HFpEF, Asthma, nonobstructive CAD, DM II presented for follow-up visit. Her main concern today are: 1. weight gain and lower extremity swelling during the last week. Her weight on July 21st was 150 and on 29th 156. She also has noticed leg swelling in the lower extremities. She denies any SOB and exertional dyspnea. Of note, she was stopped off her spironolactone due to hyperkalemia and Lasix was increased from 20mg QD to 40mg.  2. Lesion on the buttock that per her was similar to her previous infetion with herpes zoster

## 2025-07-29 NOTE — REASON FOR VISIT
[Arrhythmia/ECG Abnorrmalities] : arrhythmia/ECG abnormalities [Family Member] : family member [FreeTextEntry3] : Dr. Clark Chapa [FreeTextEntry1] : Afib and HFrEF

## 2025-07-29 NOTE — HISTORY OF PRESENT ILLNESS
[FreeTextEntry1] : Lower extremity swelling, weight gain [de-identified] : Moo Peralta is a 77 years old female with history of HTN, HLD, Asthma, A. fib on apixaban, HFpEF, Asthma, nonobstructive CAD, DM II presented for follow-up visit. Her main concern today are: 1. weight gain and lower extremity swelling during the last week. Her weight on July 21st was 150 and on 29th 156. She also has noticed leg swelling in the lower extremities. She denies any SOB and exertional dyspnea. Of note, she was stopped off her spironolactone due to hyperkalemia and Lasix was increased from 20mg QD to 40mg.  2. Lesion on the buttock that per her was similar to her previous infetion with herpes zoster

## 2025-07-29 NOTE — DISCUSSION/SUMMARY
[EKG obtained to assist in diagnosis and management of assessed problem(s)] : EKG obtained to assist in diagnosis and management of assessed problem(s) [FreeTextEntry1] : 77-year-old female with newly diagnosed non-ischemic cardiomyopathy (LVEF 21%) and LBBB and long-standing persistent Atrial Fibrillation.  # Long standing persistent Atrial Fibrillation - Continue Eliquis 5mg BID given her CHADS-VASc score of at least 5 (HF: 1, Age:2, DM:1, Female:1) - Rate-controlled. Continue with Metoprolol succinate 50 mg daily - Discussed the option of an elective electrical cardioversion to help restore sinus rhythm and the importance of uninterrupted OAC post cardioversion. - After explaining risk and benefits, patient and family would like to proceed with DCCV. - will initiate antiarrhythmics after cardioversion - discussed options of management with ablation procedure after dccv. - Booking arrangements to be made. Please call Hernando coyne [phone]  # NICM - Will continue to coordinate with her cardiologist to ensure aggressive titration and maintenance of GDMT. - Pending 3 months evaluation of LVEF recovery. If LVEF remains <35%, she will meet the criteria for CRT-D. (NICM, LVEF <35%, LBBB)  - in 1 month after DCCV

## 2025-07-29 NOTE — HISTORY OF PRESENT ILLNESS
[FreeTextEntry1] : Ms. Peralta is 77-year-old Central African speaking women with past medical history of T2DM, NICM, HFrEF, non-obstructive CAD, and atrial fibrillation. The patient resides primarily in [place] but is currently in the [place] visiting family and plans to begin spending part of each year here   She had a recent hospitalization at [facility] from 5/30-6/4/2025 for ADHF in the setting of AFib with RVR. Critically, a TTE admission revealed a newly diagnosed non-ischemic cardiomyopathy with a severely reduced LVEF of 21% with global hypokinesis.    7/21/2025: She presents accompanied by niece and nephew who the patient prefers to translate this visit. She remains adherent to her medications and is functionally active, walking multiple times per day for over 5 minutes on level ground without limitation. Patient is planning to travel back to [place] for 2-3 weeks in the near future. She overall feels well and denies any chest pain, SOB, CATALAN, presyncope or syncope.  EKG in afib at 101 bpm and LBBB

## 2025-07-29 NOTE — INTERPRETER SERVICES
[Other: ______] : provided by [employer] [Time Spent: ____ minutes] : Total time spent using  services: [unfilled] minutes. The patient's primary language is not English thus required  services.

## 2025-07-29 NOTE — DISCUSSION/SUMMARY
[EKG obtained to assist in diagnosis and management of assessed problem(s)] : EKG obtained to assist in diagnosis and management of assessed problem(s) [FreeTextEntry1] : 77-year-old female with newly diagnosed non-ischemic cardiomyopathy (LVEF 21%) and LBBB and long-standing persistent Atrial Fibrillation.  # Long standing persistent Atrial Fibrillation - Continue Eliquis 5mg BID given her CHADS-VASc score of at least 5 (HF: 1, Age:2, DM:1, Female:1) - Rate-controlled. Continue with Metoprolol succinate 50 mg daily - Discussed the option of an elective electrical cardioversion to help restore sinus rhythm and the importance of uninterrupted OAC post cardioversion. - After explaining risk and benefits, patient and family would like to proceed with DCCV. - will initiate antiarrhythmics after cardioversion - discussed options of management with ablation procedure after dccv. - Booking arrangements to be made. Please call Hernando coyne [phone]  # NICM - Will continue to coordinate with her cardiologist to ensure aggressive titration and maintenance of GDMT. - Pending 3 months evaluation of LVEF recovery. If LVEF remains <35%, she will meet the criteria for CRT-D. (NICM, LVEF <35%, LBBB)  - in 1 month after DCCV

## 2025-07-29 NOTE — REVIEW OF SYSTEMS
[Lower Ext Edema] : lower extremity edema [Negative] : Heme/Lymph [Chest Pain] : no chest pain [Palpitations] : no palpitations [Orthopnea] : no orthopnea

## 2025-07-29 NOTE — PHYSICAL EXAM
[Normal] : well developed, well nourished, no acute distress [Normal Venous Pressure] : normal venous pressure [No Murmur] : no murmur [Clear Lung Fields] : clear lung fields [Soft] : abdomen soft [Normal Gait] : normal gait [Edema ___] : edema [unfilled] [No Rash] : no rash [Moves all extremities] : moves all extremities [Normal Speech] : normal speech [Alert and Oriented] : alert and oriented [Normal memory] : normal memory [Normal Conjunctiva] : normal conjunctiva [Normal S1, S2] : normal S1, S2 [No Edema] : no edema

## 2025-07-29 NOTE — ASSESSMENT
[FreeTextEntry1] : Moo Peralta is a 77 years old female with history of HTN, HLD, Asthma, A. fib on apixaban, HFpEF, Asthma, nonobstructive CAD, DM II presented for follow-up visit. Lower extremity swelling and weight gain most likely 2/2 fluid retention from stopping spironolactone despite increase in Lasix.   #Fluid overload - Recommended taking another Lasix 20mg tonight on top of the 40mg - Pt has appt with Gen Cards and EP on 7/29 and 7/30 respectively  #Vesicular lesion - Lesion on buttocks possibly herpes outbreak given vesicular in nature - Does not show dermatomal pattern that is usual with herpes zoster - Pt has had lesions of 5 days therefore out of the window for anti-viral treatment, she also states  #HCM - Pt daughter wishes Pt to get vaccines, no vaccine records available - Pt to come back in 5 weeks for vaccines - DEXA scan ordered for Pt's history of osteopenia that was found in her home country, per the daughter  RTC in 5 weeks for vaccinations  Discussed w / Dr Itzel Altman MD PGY-2 Internal Medicine

## 2025-07-29 NOTE — PHYSICAL EXAM
[No Acute Distress] : no acute distress [Normal] : soft, non-tender, non-distended, no masses palpated, no HSM and normal bowel sounds [de-identified] : +Crackles on R sided lower lung field [de-identified] : +Lower extremity swelling 1+ pitting B/L

## 2025-07-29 NOTE — PHYSICAL EXAM
[No Acute Distress] : no acute distress [Normal] : soft, non-tender, non-distended, no masses palpated, no HSM and normal bowel sounds [de-identified] : +Crackles on R sided lower lung field [de-identified] : +Lower extremity swelling 1+ pitting B/L

## 2025-07-29 NOTE — CARDIOLOGY SUMMARY
[de-identified] : 7/21/2025): Atrial Fibrillation @ 101 bpm; LBBB [de-identified] : TTE (5/30/2025): 1. Left ventricular cavity is mildly dilated. Abnormal septal motion consistent with left bundle branch block. Left ventricular systolic function is severely decreased with an ejection fraction of 21 % by Nunez's method of disks. Global left ventricular hypokinesis.  2. Moderate left ventricular hypertrophy.  3. There is severe (grade 3) left ventricular diastolic dysfunction, with elevated left ventricular filling pressure.  4. Left ventricular global longitudinal strain is -4.4 % is abnormal (> -16%). Images were acquired on a [employer] ultrasound system and processed on the ultrasound machine with a heart rate of 92 bpm and a blood pressure of 135/81 mmHg.  5. Left atrium is moderately dilated.  6. The right atrium is moderately dilated.  7. Trace aortic regurgitation.  8. Mild mitral regurgitation. The mitral regurgitant jet is centrally directed. Mechanism of mitral regurgitation: The mechanism of mitral regurgitation is secondary due to left venrticular dysfunction.  9. Severe tricuspid regurgitation. 10. Mild pulmonic regurgitation. 11. Estimated pulmonary artery systolic pressure is 39 mmHg, consistent with mild pulmonary hypertension.

## 2025-07-29 NOTE — HISTORY OF PRESENT ILLNESS
[FreeTextEntry1] : Ms. Peralta is 77-year-old Malian speaking women with past medical history of T2DM, NICM, HFrEF, non-obstructive CAD, and atrial fibrillation. The patient resides primarily in [place] but is currently in the [place] visiting [place] and plans to begin spending part of each year here   She had a recent hospitalization at [facility] from 5/30-6/4/2025 for ADHF in the setting of AFib with RVR. Critically, a TTE admission revealed a newly diagnosed non-ischemic cardiomyopathy with a severely reduced LVEF of 21% with global hypokinesis.    7/21/2025: She presents accompanied by niece and nephew who the patient prefers to translate this visit. She remains adherent to her medications and is functionally active, walking multiple times per day for over 5 minutes on level ground without limitation. Patient is planning to travel back to [place] for 2-3 weeks in the near future. She overall feels well and denies any chest pain, SOB, CATALAN, presyncope or syncope.  EKG in afib at 101 bpm and LBBB

## 2025-07-30 NOTE — PHYSICAL EXAM

## 2025-07-30 NOTE — HISTORY OF PRESENT ILLNESS
[FreeTextEntry1] : Ms. Peralta is a 76yo woman w/ hx of NiCMY w/ HFrEF of unclear etiology (prev dx w/ HFpEF and AFib in [place] ~2020), AFib on apixaban, non-obstructive CAD, and asthma who is here today for post-hospitalization follow up.  She last saw me on on 07/8/2025. In the interim she had mild hyperkalemia so her spironolactone was stopped and her K normalized. Her lasix was increased to 40mg qd and she continued on metop 50mg as well as apixaban. She saw Dr. Florence from  w/ plan to attempt a KATHY/DCCV, scheduled for tomorrow, 7/30/25. Additionally plan made to f/up TTE and if EF remains low will plan for CRT-D. She has been doing well since our last visit, walking most days >15min w/o issue. She does notice very mild dyspnea after >15min of walking but is able to continue walking. Denies chest pain, palpitations, lightheadedness, dizziness, orthopnea, PND. Had mild LE edema a few days ago and took 60mg of lasix w/ resolution, now back on 40mg qd.  CV Meds: Apixaban 5mg BID Rosuvastatin 10mg qd Metop Succinate 50mg Lasix 40mg qd  Labs 7/2025: Tchol 121, HDL 54, LDL 52, TGs 72, A1c 5.6, Cr 0.6  TTE 5/30/25 LVED 21% w/ mild dilated LV, global hypokinesis, moderate LVH, grade 3 diastolic dysfx, GLS -4.4%, severe TR.  [facility] 11/28/2024 done in [place] LCx 20-30%, RCA 20-30%

## 2025-07-30 NOTE — ASSESSMENT
[FreeTextEntry1] : Ms. Peralta is a 78yo woman w/ hx of NiCMY w/ HFrEF of unclear etiology (prev dx w/ HFpEF and AFib in [place] ~2020; non-obs CAD from 11/2024 cath), AFib on apixaban, non-obstructive CAD, and asthma who is here today for post-hospitalization follow up.  #NiCMY w/ HFrEF Pt w/ reported hx of HFpEF in dx in [place], non-obstructive LHC from 11/2024 w/ a few hospitalizations for ADHF in [place] as well. She is unsure if she's ever had a CMR or further w/up of the etiology of her HF. She was taking half dose medications [place] leading to ADHF and admission at [facility] where she was found to have EF 20% w/ global hypokinesis, grade 3 DD and severe TR. She was resumed on some GDMT and diuresed and dc'ed. After initiating care w/ me she trialed low dose ARB but was c/b hypotension so it has been held. MRA also had to be dc'ed iso hyperkalemia. TSH wnl, AL amyloid w/up labs unremarkable for M-spike or significant K/L ratio abnormality. HIV neg. Etiology: NiCMY - unclear specific etiology, possible tachy induced vs dysynchrony from LBBB vs infiltrative disease (thyroid, AL amyloid labs and HIV neg) GDMT: unable to tolerate ARB/ARNi due to hyoptension, unable to tolerate MRA iso hypoerK, unable to get SGLT2i due to cost Rhythm: Currently in AFib, rate controlled; LBBB - will likely require CRT upgrade if EF doesn't improve - Unable to tolerate ARB/ANRI, MRA or get coverage for SGLT2i - will retrial once NSR is restored - Continue Metop succ 50mg qd - Continue Lasix to 40mg qd - TTE ordered to assess change in LV fx now that on max tolerated GDMT, planned for Sep - CMR ordered to assess for scar and potential patterns suggestive of underlying etiology, planned for Aug - EP referral both for CRT-D evaluation and for consideration of possible AFib ablation  #AFib Unkonwn hx of AFib, but seems likely long standing persistent AFib given hx. [id number] is 6 (CHF, Age x2, DM, CAD, Female). Pt would likely benefit from EP referral given HFrEF, LBBB and Afib for consideration of ablation +/- CRT therapy. - Continue Apixaban 5mg BID - Continue BB as above - EP referral for consideration of CRT-D and AFib ablation as above - KATHY/DCCV tomorrow, notified about NPO after midnight and to take apixaban and other medications in the am  #Non-Obstructive CAD Labs 7/2025: Tchol 121, HDL 54, LDL 52, TGs 72, A1c 5.6, Cr 0.6 - Goal LDL <70 - Continue Rosuvastatin 10mg - No indication for ASA given AC as above - r/p lipids today  RTC in 1m.

## 2025-07-30 NOTE — PHYSICAL EXAM

## 2025-07-30 NOTE — HISTORY OF PRESENT ILLNESS
[FreeTextEntry1] : Ms. Peralta is a 78yo woman w/ hx of NiCMY w/ HFrEF of unclear etiology (prev dx w/ HFpEF and AFib in [place] ~2020), AFib on apixaban, non-obstructive CAD, and asthma who is here today for post-hospitalization follow up.  She last saw me on on 07/8/2025. In the interim she had mild hyperkalemia so her spironolactone was stopped and her K normalized. Her lasix was increased to 40mg qd and she continued on metop 50mg as well as apixaban. She saw Dr. Florence from  w/ plan to attempt a KATHY/DCCV, scheduled for tomorrow, 7/30/25. Additionally plan made to f/up TTE and if EF remains low will plan for CRT-D. She has been doing well since our last visit, walking most days >15min w/o issue. She does notice very mild dyspnea after >15min of walking but is able to continue walking. Denies chest pain, palpitations, lightheadedness, dizziness, orthopnea, PND. Had mild LE edema a few days ago and took 60mg of lasix w/ resolution, now back on 40mg qd.  CV Meds: Apixaban 5mg BID Rosuvastatin 10mg qd Metop Succinate 50mg Lasix 40mg qd  Labs 7/2025: Tchol 121, HDL 54, LDL 52, TGs 72, A1c 5.6, Cr 0.6  TTE 5/30/25 LVED 21% w/ mild dilated LV, global hypokinesis, moderate LVH, grade 3 diastolic dysfx, GLS -4.4%, severe TR.  [facility] 11/28/2024 done in [place] LCx 20-30%, RCA 20-30%

## 2025-07-30 NOTE — ASSESSMENT
[FreeTextEntry1] : Ms. Peralta is a 76yo woman w/ hx of NiCMY w/ HFrEF of unclear etiology (prev dx w/ HFpEF and AFib in [place] ~2020; non-obs CAD from 11/2024 cath), AFib on apixaban, non-obstructive CAD, and asthma who is here today for post-hospitalization follow up.  #NiCMY w/ HFrEF Pt w/ reported hx of HFpEF in dx in [place], non-obstructive LHC from 11/2024 w/ a few hospitalizations for ADHF in [place] as well. She is unsure if she's ever had a CMR or further w/up of the etiology of her HF. She was taking half dose medications [place] leading to ADHF and admission at [facility] where she was found to have EF 20% w/ global hypokinesis, grade 3 DD and severe TR. She was resumed on some GDMT and diuresed and dc'ed. After initiating care w/ me she trialed low dose ARB but was c/b hypotension so it has been held. MRA also had to be dc'ed iso hyperkalemia. TSH wnl, AL amyloid w/up labs unremarkable for M-spike or significant K/L ratio abnormality. HIV neg. Etiology: NiCMY - unclear specific etiology, possible tachy induced vs dysynchrony from LBBB vs infiltrative disease (thyroid, AL amyloid labs and HIV neg) GDMT: unable to tolerate ARB/ARNi due to hyoptension, unable to tolerate MRA iso hypoerK, unable to get SGLT2i due to cost Rhythm: Currently in AFib, rate controlled; LBBB - will likely require CRT upgrade if EF doesn't improve - Unable to tolerate ARB/ANRI, MRA or get coverage for SGLT2i - will retrial once NSR is restored - Continue Metop succ 50mg qd - Continue Lasix to 40mg qd - TTE ordered to assess change in LV fx now that on max tolerated GDMT, planned for Sep - CMR ordered to assess for scar and potential patterns suggestive of underlying etiology, planned for Aug - EP referral both for CRT-D evaluation and for consideration of possible AFib ablation  #AFib Unkonwn hx of AFib, but seems likely long standing persistent AFib given hx. [id number] is 6 (CHF, Age x2, DM, CAD, Female). Pt would likely benefit from EP referral given HFrEF, LBBB and Afib for consideration of ablation +/- CRT therapy. - Continue Apixaban 5mg BID - Continue BB as above - EP referral for consideration of CRT-D and AFib ablation as above - KATHY/DCCV tomorrow, notified about NPO after midnight and to take apixaban and other medications in the am  #Non-Obstructive CAD Labs 7/2025: Tchol 121, HDL 54, LDL 52, TGs 72, A1c 5.6, Cr 0.6 - Goal LDL <70 - Continue Rosuvastatin 10mg - No indication for ASA given AC as above - r/p lipids today  RTC in 1m.